# Patient Record
(demographics unavailable — no encounter records)

---

## 2024-10-18 NOTE — PHYSICAL EXAM
[Chaperone Present] : A chaperone was present in the examining room during all aspects of the physical examination [FreeTextEntry2] : Stefania [FreeTextEntry1] : General: Not in acute distress, alert and oriented x3. Neck: Supple. No lymphadenopathy.  Abdomen: Soft, nontender, and nondistended. No obvious hepatosplenomegaly. No obvious hernias.  Pelvic Exam: Normal external female genitalia. Saddle sensory exam S2 to S4 is intact. Perineal reflexes not visualized. Urethra is hypermobile without prolapse, exudates, or lesions. Cough stress test is POSITIVE with cough and Valsalva with large volume of urine loss prior to In-N-Out cath.   Post void residual was checked with I/O cath and was 90 cc of clear urine. Pale and atrophic-appearing vaginal epithelium. No vaginal blood or discharge. Cervix and uterus surgically absent.  No palpable adnexal mass on bimanual exam.  All vaginal compartments are well supported

## 2024-10-18 NOTE — HISTORY OF PRESENT ILLNESS
[FreeTextEntry1] : Patient is a 77-year-old para 1 ( x 1) s/p hysterectomy in  who is referred by RAHUL Joshi for evaluation and management of urinary incontinence, frequent urination and nocturia. Patient reports leaking urine with sneezing for more than 2 years however she started leaking urine at night couple of months ago.  She states that she wakes up at night with a strong urge to urinate as soon as she stands up, the urine just pours out.    She has been wearing pads for almost 2 years.  She reports rare urinary tract infection and pain with urination when she has a UTI.  She has noted some blood in the urine approximately 1-2 week ago when she had a urine infection.  She was in [place] and was seen there in an urgent care.  She has no records with her.  Denies sensation of incomplete bladder emptying.   Daily fluid intake consist of water Denies history of nephrolithiasis.   Vaginal symptoms: She is s/p hysterectomy for abnormal uterine bleeding.  Denies being sexually active.  Denies vaginal dryness.  And denies sensation of vaginal bulge or pressure  Bowel symptoms: She denies constipation.  Denies diarrhea.  Denies fecal incontinence  GYN history: Ovarian cyst removal in , she is s/p hysterectomy in  by Dr. Gimenez at [facility].  States that she was told she has adhesions at the time of hysterectomy.  Most recent Pap smear was in 2024 which was consistent with NILM, atrophic pattern with negative high-risk HPV testing..  Denies history of abnormal Pap smears.  Denies history of breast cancer  Past medical history: Back injury following a fall in 2024, hyperlipidemia, hypertension  Past surgical history: Tonsillectomy 3, ovarian tumor removal in , nasal cyst removal in , hysterectomy in

## 2024-10-18 NOTE — PROCEDURE
[Straight Catheterization] : insertion of a straight catheter [Stress Incontinence] : stress incontinence [Patient] : the patient [___ Fr Straight Tip] : a [unfilled] in [place] straight tip catheter [None] : there were no complications with the catheter insertion [Clear] : clear [Culture] : culture [Urinalysis] : urinalysis [No Complications] : no complications [Tolerated Well] : the patient tolerated the procedure well [Post procedure instructions and information given] : Post procedure instructions and information were given and reviewed with patient. [0] : 0

## 2024-10-18 NOTE — DISCUSSION/SUMMARY
[FreeTextEntry1] : Patient was counseled regarding evaluation and management of nocturia as well as mixed urinary incontinence and recurrent urinary tract infections.  Following management plan was outlined after having a detailed discussion with the patient: 1.  Cath urine specimen was sent for urine analysis and culture.  If there is evidence of urinary tract infection, will recommend antibiotic such as Keflex 500 mg p.o. twice daily for 7 days. 2.  Discussed criteria for recurrent urinary tract infections.  She meets the criteria for recurrent UTIs, will recommend low-dose antibiotic prophylaxis with either Keflex 250 mg p.o. daily for 3 to 6 months or nitrofurantoin 100 mg p.o. daily for 3 to 6 months 3.  Discussed findings of atrophic vaginitis.  If she has recurrent UTIs, she may benefit from a trial of vaginal estradiol cream 0.1 mg/g - 1 g per vagina every night for 2 weeks followed by 1 g per vagina 2-3 times per week 4.  Discussed pathophysiology as well as management options for stress urinary incontinence including stress supervised pelvic floor therapy, pessary, bulking agent as well as retropubic mid urethral sling placement.  Discussed efficacy of each of this approach, at outpatient edge of the procedures, recovery time etc. she would like to think about it.  I recommended undergoing urodynamic testing in the meanwhile to exclude any contraindication to sling procedure and due to presence of mixed urinary incontinence 5.  Discussed management options for overactive bladder and nocturia.  Offered her a trial of medication such as Myrbetriq 25 mg p.o. daily or Gemtesa 75 mg p.o. daily.  She would like to think about it.  She will follow-up after urodynamic testing. 6.  Inquired patient about her recent hemoglobin A1c etc.  She states that all of her labs were normal recently 7.  Discussed perineal washing etc. 8.  Follow-up after urodynamic testing

## 2024-10-18 NOTE — LETTER BODY
[Dear  ___] : Dear ~ABHINAV, [Attached please find my note.] : Attached please find my note. [Thank you very much for allowing me to participate in the care of this patient. If you have any questions, please do not hesitate to contact me] : Thank you very much for allowing me to participate in the care of this patient. If you have any questions, please do not hesitate to contact me.

## 2024-12-09 NOTE — ASSESSMENT
[FreeTextEntry1] : Patient's above urodynamic test findings are consistent with normal sensation, normal compliance, normal cystometric capacity, presence of a stress urinary incontinence starting at filled volume of 100 cc, absence of detrusor overactivity and absence of voiding dysfunction.

## 2024-12-09 NOTE — HISTORY OF PRESENT ILLNESS
[FreeTextEntry1] : Patient is a 77-year-old primipara s/p hysterectomy in 2007, presenting with symptoms of mixed urinary incontinence for past 2 to 3 years with sudden worsening in the past couple of months as well as new onset of urine infections in the past 1 year. Patient was seen in the office for initial consultation on 10/18/2024. At that time, she was noted to have a normal postvoid residual, hypermobile urethra as well as POSITIVE stress test with cough and Valsalva. Patient also reports nocturia despite stopping fluid intake 2 to 3 hours prior to retiring to bed. Patient was counseled regarding management options. Urodynamic testing was advised to exclude any contraindication for sling procedure. Patient presented for urodynamic testing on 12/2/2024. Her urodynamic test findings include an inconclusive uroflow due to small voided volume of 78 cc, postvoid residual of 30 cc, max flow of 31, and normal continuous configuration of uroflow; her complex cystogram showed normal sensation, normal compliance, normal cystometric capacity of 370 cc however she voided 559 cc for the pressure voiding study indicating a cystometric capacity of at least 559 cc absence of detrusor overactivity during filling and presence of a stress urinary incontinence with cough starting at filled volume of 100 cc and with Valsalva starting at filled volume of 370 cc; she voided 559 cc for the pressure voiding study with max flow rate of 36, average flow rate of 20, flow time of 27 seconds, detrusor pressure at max flow of 7 cm of water, Valsalva as the mechanism of void and intermittent configuration of uroflow Patient's recent urine analysis and cultures are negative.

## 2024-12-09 NOTE — DISCUSSION/SUMMARY
[FreeTextEntry1] : I discussed the results of the urodynamic testing with the patient.  We again discussed management options for stress urinary incontinence including expectant management, pelvic floor exercises, pessary, urethral bulking as well as mid urethral sling placement.  I discussed the long-term efficacy, postoperative restrictions, recovery time, postoperative complications etc. for each of these approach.  She states that she has tried Kegel exercises without significant improvement.  She does not force foresee herself using pessary for a long time.  She would like to have a permanent solution for her urinary incontinence.  However she is concerned about anesthesia because of her age.  She has no cardiac or neurological history.  Last anesthesia was 20 years ago for hysterectomy which she recovered very well from.  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  She would like to proceed with the midurethral sling placement after the holidays.  I reviewed the procedure of retropubic mid urethral sling placement in detail using fingers and computer software.  I also provided her a brochure regarding the procedure as well as the alternatives.  She will review it.  Encouraged her to call with any questions or concerns.  OR booking sheet submitted.